# Patient Record
Sex: MALE | Race: WHITE | NOT HISPANIC OR LATINO | Employment: FULL TIME | URBAN - METROPOLITAN AREA
[De-identification: names, ages, dates, MRNs, and addresses within clinical notes are randomized per-mention and may not be internally consistent; named-entity substitution may affect disease eponyms.]

---

## 2018-11-20 ENCOUNTER — OFFICE VISIT - HEALTHEAST (OUTPATIENT)
Dept: FAMILY MEDICINE | Facility: CLINIC | Age: 43
End: 2018-11-20

## 2018-11-20 ENCOUNTER — COMMUNICATION - HEALTHEAST (OUTPATIENT)
Dept: TELEHEALTH | Facility: CLINIC | Age: 43
End: 2018-11-20

## 2018-11-20 DIAGNOSIS — F33.9 MAJOR DEPRESSION, RECURRENT, CHRONIC (H): ICD-10-CM

## 2018-11-20 DIAGNOSIS — F41.9 ANXIETY: ICD-10-CM

## 2018-11-20 ASSESSMENT — MIFFLIN-ST. JEOR: SCORE: 2143.64

## 2018-12-27 ENCOUNTER — AMBULATORY - HEALTHEAST (OUTPATIENT)
Dept: FAMILY MEDICINE | Facility: CLINIC | Age: 43
End: 2018-12-27

## 2018-12-27 DIAGNOSIS — F32.4 MAJOR DEPRESSIVE DISORDER WITH SINGLE EPISODE, IN PARTIAL REMISSION (H): ICD-10-CM

## 2018-12-27 DIAGNOSIS — L91.8 CUTANEOUS SKIN TAGS: ICD-10-CM

## 2018-12-27 DIAGNOSIS — F41.9 ANXIETY: ICD-10-CM

## 2018-12-27 ASSESSMENT — MIFFLIN-ST. JEOR: SCORE: 2161.79

## 2019-06-10 ENCOUNTER — COMMUNICATION - HEALTHEAST (OUTPATIENT)
Dept: FAMILY MEDICINE | Facility: CLINIC | Age: 44
End: 2019-06-10

## 2019-06-10 DIAGNOSIS — F33.9 MAJOR DEPRESSION, RECURRENT, CHRONIC (H): ICD-10-CM

## 2019-06-10 DIAGNOSIS — F41.9 ANXIETY: ICD-10-CM

## 2019-11-03 ENCOUNTER — HEALTH MAINTENANCE LETTER (OUTPATIENT)
Age: 44
End: 2019-11-03

## 2019-12-13 ENCOUNTER — COMMUNICATION - HEALTHEAST (OUTPATIENT)
Dept: FAMILY MEDICINE | Facility: CLINIC | Age: 44
End: 2019-12-13

## 2019-12-13 DIAGNOSIS — F33.9 MAJOR DEPRESSION, RECURRENT, CHRONIC (H): ICD-10-CM

## 2019-12-13 DIAGNOSIS — F41.9 ANXIETY: ICD-10-CM

## 2019-12-31 ENCOUNTER — OFFICE VISIT - HEALTHEAST (OUTPATIENT)
Dept: FAMILY MEDICINE | Facility: CLINIC | Age: 44
End: 2019-12-31

## 2019-12-31 DIAGNOSIS — F32.4 MAJOR DEPRESSIVE DISORDER WITH SINGLE EPISODE, IN PARTIAL REMISSION (H): ICD-10-CM

## 2019-12-31 ASSESSMENT — PATIENT HEALTH QUESTIONNAIRE - PHQ9: SUM OF ALL RESPONSES TO PHQ QUESTIONS 1-9: 1

## 2019-12-31 ASSESSMENT — MIFFLIN-ST. JEOR: SCORE: 2116.43

## 2020-01-02 ASSESSMENT — PATIENT HEALTH QUESTIONNAIRE - PHQ9: SUM OF ALL RESPONSES TO PHQ QUESTIONS 1-9: 1

## 2020-01-07 ENCOUNTER — COMMUNICATION - HEALTHEAST (OUTPATIENT)
Dept: FAMILY MEDICINE | Facility: CLINIC | Age: 45
End: 2020-01-07

## 2020-01-07 DIAGNOSIS — L91.8 SKIN TAG: ICD-10-CM

## 2020-01-16 ENCOUNTER — RECORDS - HEALTHEAST (OUTPATIENT)
Dept: ADMINISTRATIVE | Facility: OTHER | Age: 45
End: 2020-01-16

## 2020-11-16 ENCOUNTER — HEALTH MAINTENANCE LETTER (OUTPATIENT)
Age: 45
End: 2020-11-16

## 2020-12-17 ENCOUNTER — COMMUNICATION - HEALTHEAST (OUTPATIENT)
Dept: FAMILY MEDICINE | Facility: CLINIC | Age: 45
End: 2020-12-17

## 2020-12-17 DIAGNOSIS — F33.9 MAJOR DEPRESSION, RECURRENT, CHRONIC (H): ICD-10-CM

## 2020-12-17 DIAGNOSIS — F41.9 ANXIETY: ICD-10-CM

## 2021-01-14 ENCOUNTER — OFFICE VISIT - HEALTHEAST (OUTPATIENT)
Dept: FAMILY MEDICINE | Facility: CLINIC | Age: 46
End: 2021-01-14

## 2021-01-14 DIAGNOSIS — F33.9 MAJOR DEPRESSION, RECURRENT, CHRONIC (H): ICD-10-CM

## 2021-01-14 DIAGNOSIS — F41.9 ANXIETY: ICD-10-CM

## 2021-01-14 ASSESSMENT — PATIENT HEALTH QUESTIONNAIRE - PHQ9: SUM OF ALL RESPONSES TO PHQ QUESTIONS 1-9: 0

## 2021-05-26 ASSESSMENT — PATIENT HEALTH QUESTIONNAIRE - PHQ9: SUM OF ALL RESPONSES TO PHQ QUESTIONS 1-9: 1

## 2021-05-27 ASSESSMENT — PATIENT HEALTH QUESTIONNAIRE - PHQ9: SUM OF ALL RESPONSES TO PHQ QUESTIONS 1-9: 0

## 2021-06-02 VITALS — BODY MASS INDEX: 39.37 KG/M2 | WEIGHT: 275 LBS | HEIGHT: 70 IN

## 2021-06-02 VITALS — BODY MASS INDEX: 39.94 KG/M2 | WEIGHT: 279 LBS | HEIGHT: 70 IN

## 2021-06-04 VITALS
HEART RATE: 104 BPM | SYSTOLIC BLOOD PRESSURE: 132 MMHG | WEIGHT: 269 LBS | DIASTOLIC BLOOD PRESSURE: 82 MMHG | RESPIRATION RATE: 16 BRPM | BODY MASS INDEX: 38.51 KG/M2 | HEIGHT: 70 IN

## 2021-06-04 NOTE — TELEPHONE ENCOUNTER
Left message for pt to call back to schedule an appt.  Once appt has been scheduled a refill can be sent.

## 2021-06-04 NOTE — PROGRESS NOTES
"Assessment/ Plan     1. Major depressive disorder with single episode, in partial remission (H)  Patient comes in today to follow-up of depression and anxiety.  Its been a year since I have seen him.  His symptoms are under excellent control with 10 mg of fluoxetine.  He would like to stay on that dose for now.  He plans to follow-up with me this spring for complete physical exam with fasting blood work.  He declines his flu shot and tetanus shot today.      Subjective:       Gerber Herrera is a 44 y.o. male who presents for follow-up of depression and anxiety.  I saw him over a year ago and he was having some depression and some anxiety symptoms.  We have fluoxetine and this made a dramatic difference.  He does not feel like he needs to go up on the dose.  He is not having any adverse side effects.  We discussed having him come in for physical at some point as he has not had this done in quite some time.  He just got a new puppy, so was trying to be better about exercise.  We did discuss that he is due for tetanus today and flu shot, but he declines both.    Relevant past medical, family, surgical, and social history reviewed with patient, unless noted in HPI, not pertinent for this visit.  Medications were discussed and reconciled.   Review of Systems   A 12 point comprehensive review of systems was negative except as noted.      Current Outpatient Medications   Medication Sig Dispense Refill     cetirizine (ZYRTEC) 10 MG tablet Take 10 mg by mouth daily.       FLUoxetine (PROZAC) 10 MG capsule Take 1 capsule (10 mg total) by mouth daily. 60 capsule 2     No current facility-administered medications for this visit.        Objective:      /82   Pulse (!) 104   Resp 16   Ht 5' 10\" (1.778 m)   Wt (!) 269 lb (122 kg)   BMI 38.60 kg/m        General appearance: alert, appears stated age and cooperative  Lungs: clear to auscultation bilaterally  Heart: regular rate and rhythm, S1, S2 normal, no murmur, click, " rub or gallop      No results found for this or any previous visit (from the past 168 hour(s)).       This note has been dictated using voice recognition software. Any grammatical or context distortions are unintentional and inherent to the software

## 2021-06-04 NOTE — TELEPHONE ENCOUNTER
Please let patient know that they are due for an office visit.  I will refill the medications once this appointment is made.  Thank you.

## 2021-06-04 NOTE — TELEPHONE ENCOUNTER
RN cannot approve Refill Request    RN can NOT refill this medication Protocol failed and NO refill given.      Zainab Shay, [facility] Triage/Med Refill 12/15/2019    Requested Prescriptions   Pending Prescriptions Disp Refills     FLUoxetine (PROZAC) 10 MG capsule [Pharmacy Med Name: FLUOXETINE HCL 10 MG CAPSULE] 60 capsule 2     Sig: TAKE 1 CAPSULE BY MOUTH EVERY DAY       [facility] Refill Protocol  Failed - 12/13/2019  4:52 AM        Failed - PCP or prescribing provider visit in last year     Last office visit with prescriber/PCP: 11/20/2018 Kathia Garcia MD OR same dept: Visit date not found OR same specialty: 11/20/2018 Kathia Garcia MD  Last physical: Visit date not found Last MTM visit: Visit date not found   Next visit within 3 mo: Visit date not found  Next physical within 3 mo: Visit date not found  Prescriber OR PCP: Kathia Garcia MD  Last diagnosis associated with med order: 1. Major depression, recurrent, chronic (H)  - FLUoxetine (PROZAC) 10 MG capsule [Pharmacy Med Name: FLUOXETINE HCL 10 MG CAPSULE]; TAKE 1 CAPSULE BY MOUTH EVERY DAY  Dispense: 60 capsule; Refill: 2    2. Anxiety  - FLUoxetine (PROZAC) 10 MG capsule [Pharmacy Med Name: FLUOXETINE HCL 10 MG CAPSULE]; TAKE 1 CAPSULE BY MOUTH EVERY DAY  Dispense: 60 capsule; Refill: 2    If protocol passes may refill for 12 months if within 3 months of last provider visit (or a total of 15 months).

## 2021-06-13 NOTE — TELEPHONE ENCOUNTER
Due to be seen    Rx renewed per Medication Renewal Policy. Medication was last renewed on 12/18/19.    Zainab Shay, [facility] Triage/Med Refill 12/18/2020     Requested Prescriptions   Pending Prescriptions Disp Refills     FLUoxetine (PROZAC) 10 MG capsule [Pharmacy Med Name: FLUOXETINE HCL 10 MG CAPSULE] 60 capsule 2     Sig: TAKE 1 CAPSULE BY MOUTH EVERY DAY       [facility] Refill Protocol  Passed - 12/17/2020  4:18 AM        Passed - PCP or prescribing provider visit in last year     Last office visit with prescriber/PCP: 12/31/2019 Kathia Garcia MD OR same dept: 12/31/2019 Kathia Garcia MD OR same specialty: 12/31/2019 Kathia Garcia MD  Last physical: Visit date not found Last MTM visit: Visit date not found   Next visit within 3 mo: Visit date not found  Next physical within 3 mo: Visit date not found  Prescriber OR PCP: Kathia Garcia MD  Last diagnosis associated with med order: 1. Major depression, recurrent, chronic (H)  - FLUoxetine (PROZAC) 10 MG capsule [Pharmacy Med Name: FLUOXETINE HCL 10 MG CAPSULE]; TAKE 1 CAPSULE BY MOUTH EVERY DAY  Dispense: 60 capsule; Refill: 2    2. Anxiety  - FLUoxetine (PROZAC) 10 MG capsule [Pharmacy Med Name: FLUOXETINE HCL 10 MG CAPSULE]; TAKE 1 CAPSULE BY MOUTH EVERY DAY  Dispense: 60 capsule; Refill: 2    If protocol passes may refill for 12 months if within 3 months of last provider visit (or a total of 15 months).

## 2021-06-13 NOTE — TELEPHONE ENCOUNTER
Called and left detailed message for patient stating med was refilled and he is due for a med check.    I called patient to see when he had Ultrasound. He said he had it on Friday. I told him I would call him when we get the results back. Patient verbalized understanding our conversation.----- Message from Andrea De La Cruz sent at 2/10/2020  9:25 AM CST -----  Regarding: Lab Test or Test Related Question  Contact: [phone]  Could I please get results of chest scan, so I know what to tell my employer about work. Thank you for your time

## 2021-06-14 NOTE — PROGRESS NOTES
Gerber Herrera is a 45 y.o. male who is being evaluated via a billable video visit.      How would you like to obtain your AVS? MyChart.  If dropped from the video visit, the video invitation should be resent by: Text to cell phone: [phone]  Will anyone else be joining your video visit? No      Video Start Time: 10:20  1. Major depression, recurrent, chronic (H)  Gerber presents for med check.  Is been a year since we have seen him.  He is taking fluoxetine for depression and anxiety.  He states things are going well despite the pandemic.  His PHQ-9 equals 1.  Refills are sent.  We discussed coming in this summer for complete physical exam with blood work as he is not had this done in quite some time.  - FLUoxetine (PROZAC) 10 MG capsule; TAKE 1 CAPSULE BY MOUTH EVERY DAY  Dispense: 90 capsule; Refill: 2    2. Anxiety  As above  - FLUoxetine (PROZAC) 10 MG capsule; TAKE 1 CAPSULE BY MOUTH EVERY DAY  Dispense: 90 capsule; Refill: 2    Subjective     Gerber Herrera is 45 y.o. and presents to clinic today for the following health issues   HPI     Gerber presents for virtual visit today.  He is following up on depression and anxiety.  He is taking fluoxetine 10 mg and feels that his symptoms are under good control.  He has been working from home as a  which has been challenging.  He admits he has not been exercising and just recently been exercise bike.  We talked about having him come in for physical in the past and he will plan to do that this summer.    Objective       Vitals:  No vitals were obtained today due to virtual visit.    Physical Exam  Good eye contact and social smile, speech is normal in fluency and content.            Video-Visit Details    Type of service:  Video Visit    Video End Time (time video stopped): 10:37 AM  Originating Location (pt. Location): Home    Distant Location (provider location):  [facility]     Platform used for Video Visit: Maia

## 2021-06-21 NOTE — PROGRESS NOTES
Assessment/ Plan     1. Major depression, recurrent, chronic (H)  Patient is suffering from a single episode of moderate depression associated with anxiety after discussion of options today, I think the combination of therapy and medication would be the most beneficial.  He is on a waiting list to be seen at [facility].  We will start him on fluoxetine 10 mg once daily.  After 2 weeks, he can bump this up to 2 tablets.  Would then want to see him back in 4-6 weeks.  Reviewed potential side effects with the patient.  PHQ 9 equals 13, ROWDY 7 equals 7.  - FLUoxetine (PROZAC) 10 MG capsule; Take 1 capsule (10 mg total) by mouth daily.  Dispense: 60 capsule; Refill: 2    2. Anxiety  As discussed above.  - FLUoxetine (PROZAC) 10 MG capsule; Take 1 capsule (10 mg total) by mouth daily.  Dispense: 60 capsule; Refill: 2    Of note: Patient would like to have skin tags removed when he comes in for follow-up.  Recommend that he make procedure visits we have at least 40 minutes as he has multiple skin tags on his neck and eyelids.  Will possibly consider freezing the ones on his eyelids.  Subjective:       Gerber Herrera is a 43 y.o. male who presents for evaluation of depression.  He comes in today with his wife, Mayra.  He states that he has been struggling for about 3-4 months.  He attributes most of this to work.  He states his job is been really stressful.  He works as  and has a particularly difficult class this year.  When he is at work he just feels sick to his stomach and anxious.  He is having a hard time sleeping.  He is feeling sad and down.  He is tearful at times.  He often does not feel like doing things that he normally would.  He feels like he has low energy.  He is having some marital difficulties as well.  He has looked into therapy and is on a waiting list at [facility] for about 6 weeks from now.  He feels like in the past he had short bouts of depression that just lasted a  "short period of time and then resolved.  He also had a little bit of a surgery.  As far as family history, no major mental illness in his family.  He is not taking any medications.  He does state that he has been drinking more than would be typical for him he thinks because of the depression and anxiety.  He does not think that it would be difficult for him to cut back or stop drinking.  He does not think it is a problem at this time.  We discussed options today regarding treatment.  Recommend therapy with or without medications.  He thinks that he would like to do both.  Reviewed potential side effects including initial headache or nausea.  Did discuss sexual side effects.    Relevant past medical, family, surgical, and social history reviewed with patient, unless noted in HPI, not pertinent for this visit.    Review of Systems   A 12 point comprehensive review of systems was negative except as noted.      Current Outpatient Medications   Medication Sig Dispense Refill     cetirizine (ZYRTEC) 10 MG tablet Take 10 mg by mouth daily.       FLUoxetine (PROZAC) 10 MG capsule Take 1 capsule (10 mg total) by mouth daily. 60 capsule 2     No current facility-administered medications for this visit.        Objective:      /80   Pulse (!) 104   Resp 20   Ht 5' 10\" (1.778 m)   Wt (!) 275 lb (124.7 kg)   BMI 39.46 kg/m        General appearance: alert, appears stated age and cooperative  Good eye contact and social smile, speech is normal in fluency and content.  He is a little tearful.  Lungs: clear to auscultation bilaterally  Heart: regular rate and rhythm, S1, S2 normal, no murmur, click, rub or gallop  Skin: Multiple skin tags around his upper eyelid.      No results found for this or any previous visit (from the past 168 hour(s)).       This note has been dictated using voice recognition software. Any grammatical or context distortions are unintentional and inherent to the software  "

## 2021-06-22 NOTE — PROGRESS NOTES
Assessment/ Plan     1. Major depressive disorder with single episode, in partial remission (H)  Patient follows up today for his depression and started on 10 mg of fluoxetine.  He states that things are going much better.  He feels like his anxiety level has come down and his depression has improved.  He is having no adverse side effects.  He would like to stay on the 10 mg dosage.  Discussed that like to see him back for recheck in 6 months.  At that time we can discuss possibly weaning off the medication if he feels like he does not need it anymore.  He will let me know sooner if symptoms are returning.  We can certainly go up on the dose if needed.  ROWDY 7 equals 0, PHQ 9 equals 1.  2. Anxiety  As above.    3. Cutaneous skin tags  Patient has multiple skin tags around the neck and eyelids.  A total of 28 skin tags were excised around his neck.  He had 3 on his upper eyelids that were frozen with liquid nitrogen.  Discussed wound cares.  Did discuss with him that these often will grow back.  If the ones around his eyes are not resolving, would recommend following up with his ophthalmologist.      Subjective:       Gerber Herrera is a 43 y.o. male who presents for follow-up depression and anxiety and skin tags.  When I saw the patient about a month ago, he was suffering from some depression and anxiety.  A lot of this had to do with his work situation.  After discussion of options, at that time we started 10 mg of fluoxetine.  He states he tolerating it well without any side effects.  He states he feels a lot better.  He feels like his self-esteem is up and his anxiety level is way down.  He would like to get the skin tags taking care of around his neck and eyelids.  He has seen his ophthalmologist and they said they could laser takes off his eyelids.  The ones on his neck keep catching on things and bothering him.  On his eyes he finds them very annoying.    Relevant past medical, family, surgical, and social  "history reviewed with patient, unless noted in HPI, not pertinent for this visit.    Review of Systems   A 12 point comprehensive review of systems was negative except as noted.      Current Outpatient Medications   Medication Sig Dispense Refill     cetirizine (ZYRTEC) 10 MG tablet Take 10 mg by mouth daily.       FLUoxetine (PROZAC) 10 MG capsule Take 1 capsule (10 mg total) by mouth daily. 60 capsule 2     No current facility-administered medications for this visit.        Objective:      /80   Pulse 80   Temp 98.1  F (36.7  C) (Oral)   Resp 20   Ht 5' 10\" (1.778 m)   Wt (!) 279 lb (126.6 kg)   BMI 40.03 kg/m        General appearance: alert, appears stated age and cooperative  Good eye contact and social smile, speech is normal in fluency and content.  Head: Normocephalic, without obvious abnormality, atraumatic  Lungs: clear to auscultation bilaterally  Heart: regular rate and rhythm, S1, S2 normal, no murmur, click, rub or gallop  Skin: Multiple skin tags around his neck, some are skin colored others are pigmented and more broad-based and pedunculated.  He has several fairly large ones on his upper eyelids.    Procedure: After consent is signed, I used liquid nitrogen on the skin tags on his upper eyelids.  I froze 2 on the right lid and one on the left any freeze-thaw-freeze pattern.  He tolerated the procedure fairly well.    For the skin tags on the neck, I used 1% lidocaine with epinephrine on the broader-based larger skin tags.  I then used a pickup and a sharp iris scissors to excise them.  There was a total of 20 on the right side of his neck and 8 on the left side of his neck.  He tolerated the procedure well.  There is just minimal bleeding.  Bandages were placed.  Discussed wound cares.  He will watch for any signs of infection and keep us posted.  Would recommend keeping it covered for the next 24 hours.    No results found for this or any previous visit (from the past 168 hour(s)).   "     This note has been dictated using voice recognition software. Any grammatical or context distortions are unintentional and inherent to the software

## 2021-07-03 NOTE — ADDENDUM NOTE
Addendum Note by Kathia Gerardo MD at 12/18/2019 12:04 PM     Author: Kathia Gerardo MD Service: -- Author Type: Physician    Filed: 12/18/2019 12:04 PM Encounter Date: 12/13/2019 Status: Signed    : Kathia Gerardo MD (Physician)    Addended by: KATHIA GERARDO on: 12/18/2019 12:04 PM        Modules accepted: Orders

## 2021-09-12 ENCOUNTER — HEALTH MAINTENANCE LETTER (OUTPATIENT)
Age: 46
End: 2021-09-12

## 2022-01-02 ENCOUNTER — HEALTH MAINTENANCE LETTER (OUTPATIENT)
Age: 47
End: 2022-01-02

## 2022-05-01 ENCOUNTER — HOSPITAL ENCOUNTER (EMERGENCY)
Facility: CLINIC | Age: 47
Discharge: HOME | End: 2022-05-01
Attending: EMERGENCY MEDICINE | Admitting: EMERGENCY MEDICINE
Payer: COMMERCIAL

## 2022-05-01 VITALS
HEIGHT: 70 IN | SYSTOLIC BLOOD PRESSURE: 133 MMHG | RESPIRATION RATE: 18 BRPM | BODY MASS INDEX: 37.22 KG/M2 | DIASTOLIC BLOOD PRESSURE: 94 MMHG | TEMPERATURE: 98.4 F | OXYGEN SATURATION: 95 % | WEIGHT: 260 LBS

## 2022-05-01 DIAGNOSIS — F43.21 GRIEF REACTION: ICD-10-CM

## 2022-05-01 PROCEDURE — 99282 EMERGENCY DEPT VISIT SF MDM: CPT | Performed by: EMERGENCY MEDICINE

## 2022-05-01 PROCEDURE — 99282 EMERGENCY DEPT VISIT SF MDM: CPT

## 2022-05-01 ASSESSMENT — ENCOUNTER SYMPTOMS
AGITATION: 0
SLEEP DISTURBANCE: 0
ABDOMINAL PAIN: 0
HEADACHES: 0
DYSPHORIC MOOD: 1
APPETITE CHANGE: 0
DECREASED CONCENTRATION: 0
BACK PAIN: 0
FEVER: 0
FATIGUE: 0
COUGH: 0
LIGHT-HEADEDNESS: 0
CHEST TIGHTNESS: 0
CONFUSION: 0
CHILLS: 0
SHORTNESS OF BREATH: 0
HALLUCINATIONS: 0

## 2022-05-01 NOTE — ED TRIAGE NOTES
Patient brought in by friend who is a [facility]  due to patient having overwhelming thoughts of anxiety, depression, and thoughts of suicide without plan. 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      Triage Assessment     Row Name 05/01/22 0131       Triage Assessment (Adult)    Airway WDL WDL       Respiratory WDL    Respiratory WDL WDL       Skin Circulation/Temperature WDL    Skin Circulation/Temperature WDL WDL       Cardiac WDL    Cardiac WDL WDL       Peripheral/Neurovascular WDL    Peripheral Neurovascular WDL WDL       Cognitive/Neuro/Behavioral WDL    Cognitive/Neuro/Behavioral WDL X;mood/behavior    Mood/Behavior anxious;cooperative       Iraida Coma Scale    Best Motor Response 6-->(M6) obeys commands    Best Verbal Response 5-->(V5) oriented

## 2022-05-01 NOTE — ED TRIAGE NOTES
Brought into the ED by [place] [employer] due to depression, and suicidal ideation related to his marriage dissolving. Reports he wants the marriage to work out and his wife, not so much.     He admits to stating he wants to be dead, but has no plan. He reports at least 6 beers tonight and this has made him depressed tonight. Denies: prescribed medications, street drugs, or other medication usage.

## 2022-05-01 NOTE — ED PROVIDER NOTES
History     Chief Complaint   Patient presents with     Anxiety     HPI  Gerber Herrera is a 46 year old male with no significant contributing past medical history presenting for evaluation of severe depression tonight.  Patient has been having marital troubles with his wife who he is wanting a separation.  Patient has expressed vague suicidal thoughts of not wanting to be here and has expressed these to several friends prompting him to be brought here by a friend of his who is please officer in [place].  Patient denies any active suicidal thoughts.  Denies a history of significant depression although was on fluoxetine for short time in the past.  Patient states he is very sad about the troubles with his marriage and would very much like to work things out but his wife has not been interested in resolving the differences.  Patient currently adamantly denies any suicidal thoughts.  Denies any guns in the household.  Patient states that he would like to go home and sleep and has been in touch with his brother who will stay with him.    Allergies:  Allergies   Allergen Reactions     Toradol [Ketorolac] Nausea       Problem List:    Patient Active Problem List    Diagnosis Date Noted     External hemorrhoids 06/27/2012     Priority: Medium     Elevated BP 06/27/2012     Priority: Medium     NO ACTIVE PROBLEMS      Priority: Medium        Past Medical History:    Past Medical History:   Diagnosis Date     NO ACTIVE PROBLEMS        Past Surgical History:    Past Surgical History:   Procedure Laterality Date     HC KNEE SCOPE, DIAGNOSTIC      Right knee     ZZC REPAIR CRUCIATE LIGAMENT,KNEE      Right knee       Family History:    Family History   Problem Relation Age of Onset     Neurologic Disorder Father         MS     Hypertension Mother        Social History:  Marital Status:   [2]  Social History     Tobacco Use     Smoking status: Never Smoker     Smokeless tobacco: Never Used        Medications:   "  hydrocortisone (ANUSOL-HC) 2.5 % rectal cream  ibuprofen (ADVIL/MOTRIN) 600 MG tablet  IBUPROFEN PO          Review of Systems   Constitutional: Negative for appetite change, chills, fatigue and fever.   HENT: Negative for congestion.    Eyes: Negative for visual disturbance.   Respiratory: Negative for cough, chest tightness and shortness of breath.    Cardiovascular: Negative for chest pain.   Gastrointestinal: Negative for abdominal pain.   Musculoskeletal: Negative for back pain.   Skin: Negative for rash.   Neurological: Negative for light-headedness and headaches.   Psychiatric/Behavioral: Positive for dysphoric mood and suicidal ideas (Vague, no plan). Negative for agitation, behavioral problems, confusion, decreased concentration, hallucinations and sleep disturbance.   All other systems reviewed and are negative.      Physical Exam   BP: (!) 133/94  Temp: 98.4  F (36.9  C)  Resp: 18  Height: 177.8 cm (5' 10\")  Weight: 117.9 kg (260 lb)  SpO2: 95 %      Physical Exam  Vitals and nursing note reviewed.   Constitutional:       Comments: Tearful, upset but calm and cooperative.  Able to provide history.  Elevated BMI   HENT:      Head: Atraumatic.      Nose: Nose normal.      Mouth/Throat:      Mouth: Mucous membranes are moist.   Eyes:      Conjunctiva/sclera: Conjunctivae normal.   Cardiovascular:      Rate and Rhythm: Normal rate.      Pulses: Normal pulses.   Pulmonary:      Effort: Pulmonary effort is normal.   Musculoskeletal:         General: Normal range of motion.   Skin:     General: Skin is warm and dry.      Capillary Refill: Capillary refill takes less than 2 seconds.   Neurological:      Mental Status: He is alert and oriented to person, place, and time.   Psychiatric:         Attention and Perception: Attention normal.         Mood and Affect: Mood is depressed. Affect is tearful.         Speech: Speech normal.         Behavior: Behavior is cooperative.         Thought Content: Thought content " includes suicidal (vague thoughts, now gone) ideation. Thought content does not include homicidal ideation. Thought content does not include homicidal or suicidal plan.         Cognition and Memory: Cognition normal.         ED Course     Mental Health Risk Assessment      PSS-3    Date and Time Over the past 2 weeks have you felt down, depressed, or hopeless? Over the past 2 weeks have you had thoughts of killing yourself? Have you ever attempted to kill yourself? When did this last happen? User   05/01/22 0149 yes yes no -- RRG      C-SSRS ([place])    Date and Time Q1 Wished to be Dead (Past Month) Q2 Suicidal Thoughts (Past Month) Q3 Suicidal Thought Method Q4 Suicidal Intent without Specific Plan Q5 Suicide Intent with Specific Plan Q6 Suicide Behavior (Lifetime) Within the Past 3 Months? RETIRED: Level of Risk per Screen Screening Not Complete User   05/01/22 0211 yes yes no no no no -- -- -- JNW   05/01/22 0149 yes yes no no no no -- -- -- RRG                Item Assessment   Suicidal Ideation Wish to be dead transiently - denies currently   Plan none   Intent none   Suicidal or self-harm behaviors none   Risk Factors Recent losses or other significant negative event(s) [legal, financial, relationship, etc] and Current or pending isolation or feeling alone   Protective Factors Identified reasons for living and Supportive social network of family and/or friends and care for family              Procedures           No results found for this or any previous visit (from the past 24 hour(s)).    Medications - No data to display    Assessments & Plan (with Medical Decision Making)  46-year-old male presenting for evaluation of acute grief secondary to difficulties with his marriage.  Patient has been  for 8 years and his wife is going through a lot of stress and reportedly wants to end the marriage.  Patient is severely depressed because of this and has had vague thoughts of suicide.  Patient has no plan.   He is calm and cooperative in the ED.  He has reached out to several friends and reports a broad friend network.  His brother also has been in touch with him and is going to stay with him.  A friend  is here with him in the ED and is willing to help him to find resources.  Patient adamantly denies any suicidal thoughts and verbally contracts for safety with me.  I offered to connect him with additional resources however there is a significant delay with DEC consultation tonight due to high volumes and it would likely be 6 to 8 hours before he would be able to be seen by them.  Patient does not willing to wait and would rather go home and sleep.  No indications or reason for hold.  Patient was discharged home with his  friend who agrees to help watch over him and be sure he is in a safe place tonight and to help him coordinate outpatient resources     I have reviewed the nursing notes.    I have reviewed the findings, diagnosis, plan and need for follow up with the patient.       New Prescriptions    No medications on file       Final diagnoses:   Grief reaction       5/1/2022   [facility] EMERGENCY DEPT     Nunes, Jett Osborne MD  05/01/22 2404

## 2022-09-08 ENCOUNTER — OFFICE VISIT (OUTPATIENT)
Dept: FAMILY MEDICINE | Facility: CLINIC | Age: 47
End: 2022-09-08
Payer: COMMERCIAL

## 2022-09-08 ENCOUNTER — NURSE TRIAGE (OUTPATIENT)
Dept: NURSING | Facility: CLINIC | Age: 47
End: 2022-09-08

## 2022-09-08 VITALS
RESPIRATION RATE: 20 BRPM | WEIGHT: 244 LBS | DIASTOLIC BLOOD PRESSURE: 94 MMHG | OXYGEN SATURATION: 96 % | SYSTOLIC BLOOD PRESSURE: 134 MMHG | BODY MASS INDEX: 34.93 KG/M2 | HEART RATE: 130 BPM | TEMPERATURE: 98.1 F | HEIGHT: 70 IN

## 2022-09-08 DIAGNOSIS — R00.0 TACHYCARDIA: ICD-10-CM

## 2022-09-08 DIAGNOSIS — F33.1 MODERATE EPISODE OF RECURRENT MAJOR DEPRESSIVE DISORDER (H): Primary | ICD-10-CM

## 2022-09-08 PROCEDURE — 99214 OFFICE O/P EST MOD 30 MIN: CPT | Performed by: NURSE PRACTITIONER

## 2022-09-08 PROCEDURE — 96127 BRIEF EMOTIONAL/BEHAV ASSMT: CPT | Performed by: NURSE PRACTITIONER

## 2022-09-08 RX ORDER — FLUOXETINE 10 MG/1
CAPSULE ORAL
Qty: 60 CAPSULE | Refills: 1 | Status: SHIPPED | OUTPATIENT
Start: 2022-09-08 | End: 2022-09-29

## 2022-09-08 RX ORDER — FLUOXETINE 10 MG/1
10 CAPSULE ORAL DAILY
Qty: 30 CAPSULE | Refills: 1 | Status: SHIPPED | OUTPATIENT
Start: 2022-09-08 | End: 2022-09-08

## 2022-09-08 ASSESSMENT — ANXIETY QUESTIONNAIRES
3. WORRYING TOO MUCH ABOUT DIFFERENT THINGS: MORE THAN HALF THE DAYS
7. FEELING AFRAID AS IF SOMETHING AWFUL MIGHT HAPPEN: MORE THAN HALF THE DAYS
4. TROUBLE RELAXING: MORE THAN HALF THE DAYS
8. IF YOU CHECKED OFF ANY PROBLEMS, HOW DIFFICULT HAVE THESE MADE IT FOR YOU TO DO YOUR WORK, TAKE CARE OF THINGS AT HOME, OR GET ALONG WITH OTHER PEOPLE?: SOMEWHAT DIFFICULT
5. BEING SO RESTLESS THAT IT IS HARD TO SIT STILL: MORE THAN HALF THE DAYS
IF YOU CHECKED OFF ANY PROBLEMS ON THIS QUESTIONNAIRE, HOW DIFFICULT HAVE THESE PROBLEMS MADE IT FOR YOU TO DO YOUR WORK, TAKE CARE OF THINGS AT HOME, OR GET ALONG WITH OTHER PEOPLE: SOMEWHAT DIFFICULT
2. NOT BEING ABLE TO STOP OR CONTROL WORRYING: MORE THAN HALF THE DAYS
7. FEELING AFRAID AS IF SOMETHING AWFUL MIGHT HAPPEN: MORE THAN HALF THE DAYS
1. FEELING NERVOUS, ANXIOUS, OR ON EDGE: MORE THAN HALF THE DAYS
GAD7 TOTAL SCORE: 13
6. BECOMING EASILY ANNOYED OR IRRITABLE: SEVERAL DAYS

## 2022-09-08 ASSESSMENT — PATIENT HEALTH QUESTIONNAIRE - PHQ9
SUM OF ALL RESPONSES TO PHQ QUESTIONS 1-9: 13
SUM OF ALL RESPONSES TO PHQ QUESTIONS 1-9: 13
10. IF YOU CHECKED OFF ANY PROBLEMS, HOW DIFFICULT HAVE THESE PROBLEMS MADE IT FOR YOU TO DO YOUR WORK, TAKE CARE OF THINGS AT HOME, OR GET ALONG WITH OTHER PEOPLE: SOMEWHAT DIFFICULT

## 2022-09-08 ASSESSMENT — PAIN SCALES - GENERAL: PAINLEVEL: NO PAIN (0)

## 2022-09-08 NOTE — PROGRESS NOTES
"I, Joanne Bullock, was present with the NP student who participated in the service and in the documentationof the note.  I have verified the history and personally performed the physical exam and medical decision making.  I agree with the assessment and plan of care as documented in the note.       Joanne Bullock, CNP      Assessment & Plan     Moderate episode of recurrent major depressive disorder (H)  Patient had been on fluoxetine in the past for depression and has had good tolerability and decrease of depressive symptoms. Patient started on fluoxitine 10 mg orally daily for one week and then to increase to 20 mg daily thereafter.  Patient was instructed that it may take up to a month to se benefits of medication. Patient instructed to follow-up in month to see how medication is working. Additionally if sleeping does not improve, patient was instructed to reach back out to provider. Instructioned to continue to see therapist. Non pharmacologic interventions were given to patient to utilize.   - FLUoxetine (PROZAC) 10 MG capsule; Take one capsule by mouth daily for one week, then increase to 2 capsules daily.    Tachycardia  Tachycardia present, patient denies excessive consumption of caffeine, chest pain, shortness of breath or any other associated cardiac symptoms. Patient refusing to address tachycardia at this visit, as he wants to deal with depression and then see if tachycardia improves. Patient was instructed of it does not improve that at follow-up appointment, thi will need to be assessed, patient is agreeable to this.             BMI:   Estimated body mass index is 35.01 kg/m  as calculated from the following:    Height as of this encounter: 1.778 m (5' 10\").    Weight as of this encounter: 110.7 kg (244 lb).       Depression Screening Follow Up    PHQ 9/8/2022   PHQ-9 Total Score 13   Q9: Thoughts of better off dead/self-harm past 2 weeks Not at all     Last PHQ-9 9/8/2022   1.  Little interest " or pleasure in doing things 2   2.  Feeling down, depressed, or hopeless 3   3.  Trouble falling or staying asleep, or sleeping too much 3   4.  Feeling tired or having little energy 1   5.  Poor appetite or overeating 0   6.  Feeling bad about yourself 2   7.  Trouble concentrating 1   8.  Moving slowly or restless 1   Q9: Thoughts of better off dead/self-harm past 2 weeks 0   PHQ-9 Total Score 13       Follow Up Actions Taken  Crisis resource information provided in After Visit Summary     Patient Instructions   Check in with me in one month to discuss dose.    Possible side effects of antidepressants/anxiety meds, including but not limited to GI upset, disrupted sleep, loss of libido, worsening of mood or even possible risk of increased suicidal thoughts.   Often some of these things if not severe will improve after 1-2 weeks on medications but some may not see effects for 3-4 weeks,  if tolerable patients should continue meds and see if there is improvement.  If symptoms are intolerable or for any suicidal thoughts the medication should be stopped immediately and contact the clinic.       These medications should be used for 6-9 months before stopping, to avoid rebound symptoms.   Contact the clinic if having any problems tolerating these medications.  Take the medication daily and do not stop the medication abruptly.    Examples of Relaxation or Mindfulness Apps (available for download on Android and iOS)  Headspace  CBT-I : helps with anxiety and insomnia  Moodpath: helps with depression and/or anxiety  Mindfulness : learn mindfulness and meditation skills to help with depression and anxiety  PTSD : helps address trauma  Mindshift: helps teens and young adults who have depression or anxiety    Books to help with anxiety/depression  The Chemistry of Esperanza by Javier Altman (also has workbook)  The Chemistry of Calm by Javier Altman    Examples of Online Support Options    Health Unlocked  "(https://Dialogfeed/anxiety-depression-support/about): online anxiety and depression support group through the Anxiety and Depression Association of [place].  Mood Disorders Society of [place] Forum (http://www.mdsc.ca/forum/): online forums for a variety of topics including general mood disorders, bipolar disorder, depression, addiction, etc.    National Suicide Prevention Hotline: Call 7-551-474-TALK (4431)    Crisis Text Line:  Text to 095887    Disaster Distress Helpline: Call 1-493.668.7789 or Text \"TalkWithUs\" to 27876          [facility] TRAVIS Mayes is a 47 year old, presenting for the following health issues:  Depression and Anxiety      History of Present Illness       Mental Health Follow-up:  Patient presents to follow-up on Depression & Anxiety.Patient's depression since last visit has been:  Worse  The patient is not having other symptoms associated with depression.  Patient's anxiety since last visit has been:  Medium  The patient is not having other symptoms associated with anxiety.  Any significant life events: relationship concerns and financial concerns  Patient is not feeling anxious or having panic attacks.  Patient has no concerns about alcohol or drug use.    He eats 2-3 servings of fruits and vegetables daily.He consumes 0 sweetened beverage(s) daily.He exercises with enough effort to increase his heart rate 10 to 19 minutes per day.  He exercises with enough effort to increase his heart rate 3 or less days per week. He is missing 7 dose(s) of medications per week.    Today's PHQ-9         PHQ-9 Total Score: 13    PHQ-9 Q9 Thoughts of better off dead/self-harm past 2 weeks :   Not at all    How difficult have these problems made it for you to do your work, take care of things at home, or get along with other people: Somewhat difficult  Today's ROWDY-7 Score: 13     HPI reviewed above. Additionally, patient reports going through a recent divorce which is a " "major life stressor. Reports sleeping in 2 hour timeframe a night for a total hours of sleep of 5-6 hours. Major symptoms reported by patient is a constant state and sadness, inability to sleep, and decreased energy. Patient reports doing therapy sessions through [employer] at work and has compel maximilian 6 sessions and is scheduled to do 6 more. He expresses satisfaction with the therapist he is working with. Reports consuming 5-6 beers on nights where depression is more severe. Patient denies active or passive suicidal ideation. Reported he has taken fluoxitine in the past and had worked well for him. Patient is employed as a teacher.    Review of Systems   Constitutional, HEENT, cardiovascular, pulmonary, gi and gu systems are negative, except as otherwise noted.      Objective    BP (!) 134/94 (BP Location: Right arm, Patient Position: Sitting, Cuff Size: Adult Large)   Pulse (!) 130   Temp 98.1  F (36.7  C) (Tympanic)   Resp 20   Ht 1.778 m (5' 10\")   Wt 110.7 kg (244 lb)   SpO2 96%   BMI 35.01 kg/m    Body mass index is 35.01 kg/m .  Physical Exam  Constitutional:       Appearance: He is obese.   HENT:      Head: Normocephalic.      Mouth/Throat:      Mouth: Mucous membranes are dry.   Cardiovascular:      Rate and Rhythm: Normal rate and regular rhythm.      Pulses: Normal pulses.      Heart sounds: Normal heart sounds.   Musculoskeletal:         General: Normal range of motion.   Skin:     General: Skin is warm and dry.   Neurological:      General: No focal deficit present.      Mental Status: He is alert and oriented to person, place, and time.   Psychiatric:         Behavior: Behavior normal.         Thought Content: Thought content normal.         Judgment: Judgment normal.      Comments: General mood is that of depressive, sad, and tearful.        Latasha CAGLEP-S            "

## 2022-09-08 NOTE — TELEPHONE ENCOUNTER
Pt is phoning stating that he is going through a divorce and that he needs to get back on his depression medication    Pt states that he has no plans or thoughts of harming himself     Per disposition: See PCP Within 24 Hours    Transferred to scheduling     Care advice given per protocol and when to call back. Pt verbalized understanding and agrees to plan of care.    Talia Alvarez RN  [place] Nurse Advisor  7:10 AM 9/8/2022      COVID 19 Nurse Triage Plan/Patient Instructions    Please be aware that novel coronavirus (COVID-19) may be circulating in the community. If you develop symptoms such as fever, cough, or SOB or if you have concerns about the presence of another infection including coronavirus (COVID-19), please contact your health care provider or visit [email or web address].     Disposition/Instructions    In-Person Visit with provider recommended. Reference Visit Selection Guide.    Thank you for taking steps to prevent the spread of this virus.  o Limit your contact with others.  o Wear a simple mask to cover your cough.  o Wash your hands well and often.    Resources    [facility]: About COVID-19: [email or web address]    CDC: What to Do If You're Sick: www.cdc.gov/coronavirus/2019-ncov/about/steps-when-sick.html    CDC: Ending Home Isolation: www.cdc.gov/coronavirus/2019-ncov/hcp/disposition-in-home-patients.html     CDC: Caring for Someone: www.cdc.gov/coronavirus/2019-ncov/if-you-are-sick/care-for-someone.html     [facility]: Interim Guidance for Hospital Discharge to Home: www.health.[facility].[place].us/diseases/coronavirus/hcp/hospdischarge.pdf    [facility] clinical trials (COVID-19 research studies): [email or web address]     Below are the COVID-19 hotlines at the [place] Department of Health ([facility]). Interpreters are available.   o For health questions: Call 327-253-8416 or 1-499.560.7780 (7 a.m. to 7 p.m.)  o For questions about schools and  childcare: Call 088-958-7048 or 1-971.882.7468 (7 a.m. to 7 p.m.)                       Reason for Disposition    Symptoms interfere with work or school    Additional Information    Negative: Patient attempted suicide    Negative: Patient is threatening suicide now    Negative: Violent behavior, or threatening to physically hurt or kill someone    Negative: [1] Patient is very confused (disoriented, slurred speech) AND [2] no other adult (e.g., friend or family member) available    Negative: [1] Difficult to awaken or acting very confused (disoriented, slurred speech) AND [2] new-onset    Negative: Sounds like a life-threatening emergency to the triager    Negative: Suicide thoughts, threats, attempts, or questions    Negative: Questions or concerns about alcohol use, unhealthy alcohol use, binge drinking, intoxication, or withdrawal    Negative: Questions or concerns about substance use (drug use), unhealthy drug use, intoxication, or withdrawal    Negative: Questions or concerns about bipolar disorder (manic depression)    Negative: Questions or concerns about depression during the postpartum period (< 1 year since delivery)    Negative: [1] Depression AND [2] unable to do any of normal activities (e.g., self care, school, work; in comparison to baseline).    Negative: Very strange or confused behavior    Negative: Patient sounds very sick or weak to the triager    Negative: [1] Depression AND [2] worsening (e.g.,sleeping poorly, less able to do activities of daily living)    Protocols used: DEPRESSION-A-AH

## 2022-09-08 NOTE — PATIENT INSTRUCTIONS
"Check in with me in one month to discuss dose.    Possible side effects of antidepressants/anxiety meds, including but not limited to GI upset, disrupted sleep, loss of libido, worsening of mood or even possible risk of increased suicidal thoughts.   Often some of these things if not severe will improve after 1-2 weeks on medications but some may not see effects for 3-4 weeks,  if tolerable patients should continue meds and see if there is improvement.  If symptoms are intolerable or for any suicidal thoughts the medication should be stopped immediately and contact the clinic.       These medications should be used for 6-9 months before stopping, to avoid rebound symptoms.   Contact the clinic if having any problems tolerating these medications.  Take the medication daily and do not stop the medication abruptly.    Examples of Relaxation or Mindfulness Apps (available for download on Android and iOS)  Headspace  CBT-I : helps with anxiety and insomnia  Moodpath: helps with depression and/or anxiety  Mindfulness : learn mindfulness and meditation skills to help with depression and anxiety  PTSD : helps address trauma  Mindshift: helps teens and young adults who have depression or anxiety    Books to help with anxiety/depression  The Chemistry of Esperanza by Javier Altman (also has workbook)  The Chemistry of Calm by Javier Altman    Examples of Online Support Options    [employer] (https://adjust/anxiety-depression-support/about): online anxiety and depression support group through the Anxiety and Depression Association of [place].  Mood Disorders Society of [place] Forum (http://www.mdsc.ca/forum/): online forums for a variety of topics including general mood disorders, bipolar disorder, depression, addiction, etc.    National Suicide Prevention Hotline: Call 9-653-699-EXII (5207)    Crisis Text Line:  Text to 388863    Disaster Distress Helpline: Call 1-563.398.2075 or Text \"TalkWithUs\" to " 03979

## 2022-09-14 ENCOUNTER — MYC MEDICAL ADVICE (OUTPATIENT)
Dept: FAMILY MEDICINE | Facility: CLINIC | Age: 47
End: 2022-09-14

## 2022-09-14 DIAGNOSIS — F41.9 ANXIETY: Primary | ICD-10-CM

## 2022-09-14 DIAGNOSIS — F33.1 MODERATE EPISODE OF RECURRENT MAJOR DEPRESSIVE DISORDER (H): ICD-10-CM

## 2022-09-15 RX ORDER — HYDROXYZINE HYDROCHLORIDE 25 MG/1
12.5-5 TABLET, FILM COATED ORAL 3 TIMES DAILY PRN
Qty: 60 TABLET | Refills: 0 | Status: SHIPPED | OUTPATIENT
Start: 2022-09-15 | End: 2022-09-15

## 2022-09-15 RX ORDER — HYDROXYZINE HYDROCHLORIDE 25 MG/1
12.5-5 TABLET, FILM COATED ORAL 3 TIMES DAILY PRN
Qty: 60 TABLET | Refills: 0 | Status: SHIPPED | OUTPATIENT
Start: 2022-09-15

## 2022-09-15 NOTE — TELEPHONE ENCOUNTER
See MyChart message; patient was seen on 9/8/22 in clinic for depression and anxiety. Routing to provider for review and any further recommendations.    Aida Camarena RN  [facility]

## 2022-09-29 RX ORDER — FLUOXETINE 40 MG/1
40 CAPSULE ORAL DAILY
Qty: 30 CAPSULE | Refills: 3 | Status: SHIPPED | OUTPATIENT
Start: 2022-09-29

## 2022-11-19 ENCOUNTER — HEALTH MAINTENANCE LETTER (OUTPATIENT)
Age: 47
End: 2022-11-19

## 2023-04-09 ENCOUNTER — HEALTH MAINTENANCE LETTER (OUTPATIENT)
Age: 48
End: 2023-04-09

## 2024-06-16 ENCOUNTER — HEALTH MAINTENANCE LETTER (OUTPATIENT)
Age: 49
End: 2024-06-16

## 2025-06-21 ENCOUNTER — HEALTH MAINTENANCE LETTER (OUTPATIENT)
Age: 50
End: 2025-06-21